# Patient Record
Sex: FEMALE | Race: WHITE | NOT HISPANIC OR LATINO | Employment: FULL TIME | URBAN - METROPOLITAN AREA
[De-identification: names, ages, dates, MRNs, and addresses within clinical notes are randomized per-mention and may not be internally consistent; named-entity substitution may affect disease eponyms.]

---

## 2018-07-18 ENCOUNTER — TELEPHONE (OUTPATIENT)
Dept: FAMILY MEDICINE | Facility: CLINIC | Age: 43
End: 2018-07-18

## 2018-08-29 ENCOUNTER — TELEPHONE (OUTPATIENT)
Dept: FAMILY MEDICINE | Facility: CLINIC | Age: 43
End: 2018-08-29

## 2018-08-29 ENCOUNTER — APPOINTMENT (OUTPATIENT)
Dept: URBAN - METROPOLITAN AREA CLINIC 200 | Age: 43
Setting detail: DERMATOLOGY
End: 2018-09-11

## 2018-08-29 DIAGNOSIS — D22 MELANOCYTIC NEVI: ICD-10-CM

## 2018-08-29 DIAGNOSIS — L57.8 OTHER SKIN CHANGES DUE TO CHRONIC EXPOSURE TO NONIONIZING RADIATION: ICD-10-CM

## 2018-08-29 DIAGNOSIS — R20.2 PARESTHESIA OF SKIN: ICD-10-CM

## 2018-08-29 PROBLEM — D22.62 MELANOCYTIC NEVI OF LEFT UPPER LIMB, INCLUDING SHOULDER: Status: ACTIVE | Noted: 2018-08-29

## 2018-08-29 PROBLEM — L85.3 XEROSIS CUTIS: Status: ACTIVE | Noted: 2018-08-29

## 2018-08-29 PROCEDURE — OTHER COUNSELING: OTHER

## 2018-08-29 PROCEDURE — 99213 OFFICE O/P EST LOW 20 MIN: CPT

## 2018-08-29 ASSESSMENT — LOCATION SIMPLE DESCRIPTION DERM
LOCATION SIMPLE: LEFT THUMB
LOCATION SIMPLE: CHEST
LOCATION SIMPLE: LEFT UPPER BACK

## 2018-08-29 ASSESSMENT — LOCATION DETAILED DESCRIPTION DERM
LOCATION DETAILED: LEFT MEDIAL SUPERIOR CHEST
LOCATION DETAILED: LEFT PROXIMAL RADIAL THUMB
LOCATION DETAILED: LEFT SUPERIOR MEDIAL UPPER BACK

## 2018-08-29 ASSESSMENT — LOCATION ZONE DERM
LOCATION ZONE: FINGER
LOCATION ZONE: TRUNK

## 2018-08-29 NOTE — TELEPHONE ENCOUNTER
Pt called requesting a referral. Referral put in for derm, asked pt to make appt. Its been over 1 year since last visit

## 2018-09-26 ENCOUNTER — OFFICE VISIT (OUTPATIENT)
Dept: FAMILY MEDICINE | Facility: CLINIC | Age: 43
End: 2018-09-26
Payer: COMMERCIAL

## 2018-09-26 VITALS
SYSTOLIC BLOOD PRESSURE: 116 MMHG | RESPIRATION RATE: 16 BRPM | HEART RATE: 80 BPM | DIASTOLIC BLOOD PRESSURE: 70 MMHG | TEMPERATURE: 98.3 F | WEIGHT: 137 LBS

## 2018-09-26 DIAGNOSIS — M50.30 DDD (DEGENERATIVE DISC DISEASE), CERVICAL: Primary | ICD-10-CM

## 2018-09-26 PROBLEM — J30.9 ALLERGIC RHINITIS: Status: ACTIVE | Noted: 2018-09-26

## 2018-09-26 PROCEDURE — 99213 OFFICE O/P EST LOW 20 MIN: CPT | Performed by: FAMILY MEDICINE

## 2018-09-26 RX ORDER — CYCLOBENZAPRINE HCL 10 MG
10 TABLET ORAL 2 TIMES DAILY PRN
Qty: 20 TABLET | Refills: 0 | Status: SHIPPED | OUTPATIENT
Start: 2018-09-26 | End: 2019-02-22 | Stop reason: ALTCHOICE

## 2018-09-26 RX ORDER — NAPROXEN 500 MG/1
500 TABLET ORAL 2 TIMES DAILY WITH MEALS
Qty: 20 TABLET | Refills: 2 | Status: SHIPPED | OUTPATIENT
Start: 2018-09-26 | End: 2019-02-22 | Stop reason: ALTCHOICE

## 2018-09-26 RX ORDER — MINERAL OIL
180 ENEMA (ML) RECTAL DAILY
COMMUNITY
Start: 2017-06-13 | End: 2021-04-23 | Stop reason: ALTCHOICE

## 2018-09-26 ASSESSMENT — ENCOUNTER SYMPTOMS
ACTIVITY CHANGE: 1
CHILLS: 0
FEVER: 0
NECK STIFFNESS: 1
ARTHRALGIAS: 1
NECK PAIN: 1
FATIGUE: 0

## 2018-09-26 NOTE — PROGRESS NOTES
Subjective      Patient ID: Holly Peterson is a 42 y.o. female.    Has had for years. Was evaluated in past -hx of DDD. Never did any treatment. Slight pain into right shoulder      Neck Pain    This is a recurrent problem. The current episode started in the past 7 days. The problem occurs constantly. The pain is associated with an unknown factor. The pain is present in the midline (mid and bottom right). The pain is moderate. The symptoms are aggravated by bending. Stiffness is present in the morning. Pertinent negatives include no fever.       Tobacco  Allergies  Meds  Problems  Med Hx  Surg Hx  Fam Hx  Soc Hx        Review of Systems   Constitutional: Positive for activity change. Negative for chills, fatigue and fever.   Musculoskeletal: Positive for arthralgias, neck pain and neck stiffness.     Allergies   Allergen Reactions   • No Known Allergies      Current Outpatient Prescriptions   Medication Sig Dispense Refill   • fexofenadine (ALLEGRA) 180 mg tablet Take 180 mg by mouth daily.     • cyclobenzaprine (FLEXERIL) 10 mg tablet Take 1 tablet (10 mg total) by mouth 2 (two) times a day as needed for muscle spasms. 20 tablet 0   • naproxen (NAPROSYN) 500 mg tablet Take 1 tablet (500 mg total) by mouth 2 (two) times a day with meals. 20 tablet 2     No current facility-administered medications for this visit.      Past Medical History:   Diagnosis Date   • Allergic rhinitis      Past Surgical History:   Procedure Laterality Date   • SINUS SURGERY  2006     Social History   Substance Use Topics   • Smoking status: Never Smoker   • Smokeless tobacco: Never Used   • Alcohol use Not on file     History reviewed. No pertinent family history.    Objective   Vitals:    09/26/18 1058   BP: 116/70   Pulse: 80   Resp: 16   Temp: 36.8 °C (98.3 °F)   Weight: 62.1 kg (137 lb)    There is no height or weight on file to calculate BMI.  Physical Exam   Constitutional: She is oriented to person, place, and time. She appears  well-developed and well-nourished. She appears distressed (uncomfortable).   Musculoskeletal:        Right shoulder: Normal.        Left shoulder: Normal.        Cervical back: She exhibits decreased range of motion, tenderness, pain and spasm.   Neurological: She is alert and oriented to person, place, and time. She has normal strength and normal reflexes. No sensory deficit.       Assessment/Plan   Problem List Items Addressed This Visit        Other    DDD (degenerative disc disease), cervical - Primary    Relevant Medications    cyclobenzaprine (FLEXERIL) 10 mg tablet    naproxen (NAPROSYN) 500 mg tablet    Other Relevant Orders    Ambulatory referral to Physical Therapy      Patient with history of underlying degenerative disc disease and degenerative joint disease of her cervical spine.  This appears to be acute on chronic pain.  Try Flexeril at night.  Rx Naprosyn twice daily.  Patient never followed up and had physical therapy in the past so we will try this-prescription given.  Discussed with patient to follow-up with Orth O and/or get MRI if no improvement after 4 weeks.    Patient agrees and verbalizes understanding of medication and treatment plan discussed at today's visit.  Patient was instructed to call or follow-up if symptoms persist or worsen.    No notes on file    Gwen Peng DO    This note was created with voice recognition software. Inadvertent dictation errors should be disregarded. Please contact my office for clarification.

## 2019-02-22 ENCOUNTER — OFFICE VISIT (OUTPATIENT)
Dept: FAMILY MEDICINE | Facility: CLINIC | Age: 44
End: 2019-02-22
Payer: COMMERCIAL

## 2019-02-22 VITALS
WEIGHT: 133.6 LBS | HEIGHT: 69 IN | SYSTOLIC BLOOD PRESSURE: 110 MMHG | HEART RATE: 92 BPM | BODY MASS INDEX: 19.79 KG/M2 | RESPIRATION RATE: 16 BRPM | TEMPERATURE: 98.2 F | DIASTOLIC BLOOD PRESSURE: 70 MMHG

## 2019-02-22 DIAGNOSIS — R50.9 FEBRILE ILLNESS: Primary | ICD-10-CM

## 2019-02-22 DIAGNOSIS — J10.1 INFLUENZA A: ICD-10-CM

## 2019-02-22 DIAGNOSIS — M50.30 DDD (DEGENERATIVE DISC DISEASE), CERVICAL: ICD-10-CM

## 2019-02-22 PROCEDURE — 99214 OFFICE O/P EST MOD 30 MIN: CPT | Performed by: FAMILY MEDICINE

## 2019-02-22 PROCEDURE — 87804 INFLUENZA ASSAY W/OPTIC: CPT | Performed by: FAMILY MEDICINE

## 2019-02-22 RX ORDER — BUDESONIDE 0.5 MG/2ML
0.5 INHALANT ORAL AS NEEDED
COMMUNITY
Start: 2019-02-19 | End: 2021-04-23 | Stop reason: ALTCHOICE

## 2019-02-22 RX ORDER — OSELTAMIVIR PHOSPHATE 75 MG/1
75 CAPSULE ORAL 2 TIMES DAILY
Qty: 10 CAPSULE | Refills: 0 | Status: SHIPPED | OUTPATIENT
Start: 2019-02-22 | End: 2019-02-27

## 2019-02-22 ASSESSMENT — ENCOUNTER SYMPTOMS
SINUS PAIN: 1
ACTIVITY CHANGE: 1
FEVER: 1
COUGH: 1
FATIGUE: 1
CHILLS: 1
HEADACHES: 1
SINUS PRESSURE: 1

## 2019-02-22 NOTE — PROGRESS NOTES
"Subjective      Patient ID: Holly Peterson is a 43 y.o. female.    HPI    Med Hx  Surg Hx       Review of Systems  Allergies   Allergen Reactions   • No Known Allergies      Current Outpatient Prescriptions   Medication Sig Dispense Refill   • cyclobenzaprine (FLEXERIL) 10 mg tablet Take 1 tablet (10 mg total) by mouth 2 (two) times a day as needed for muscle spasms. (Patient not taking: Reported on 2/22/2019 ) 20 tablet 0   • fexofenadine (ALLEGRA) 180 mg tablet Take 180 mg by mouth daily.     • naproxen (NAPROSYN) 500 mg tablet Take 1 tablet (500 mg total) by mouth 2 (two) times a day with meals. (Patient not taking: Reported on 2/22/2019 ) 20 tablet 2     No current facility-administered medications for this visit.      Past Medical History:   Diagnosis Date   • Allergic rhinitis      Past Surgical History:   Procedure Laterality Date   • SINUS SURGERY  2006     Social History   Substance Use Topics   • Smoking status: Never Smoker   • Smokeless tobacco: Never Used   • Alcohol use Not on file     No family history on file.    Objective   Vitals:    02/22/19 1257   BP: 110/70   Pulse: 92   Resp: 16   Temp: 36.8 °C (98.2 °F)   Weight: 60.6 kg (133 lb 9.6 oz)   Height: 1.753 m (5' 9\")    Body mass index is 19.73 kg/m².  Physical Exam    Assessment/Plan   Problem List Items Addressed This Visit     None          Patient agrees and verbalizes understanding of medication and treatment plan discussed at today's visit.  Patient was instructed to call or follow-up if symptoms persist or worsen.    No notes on file    Gwen Peng, DO    This note was created with voice recognition software. Inadvertent dictation errors should be disregarded. Please contact my office for clarification.  "

## 2019-02-22 NOTE — PROGRESS NOTES
Subjective      Patient ID: Holly Peterson is a 43 y.o. female.    Sons was sick and then patient started with sudden symptoms 3 days ago. Achy, fever and chills, h/a.  Also patient continues to have neck pain daily. PT did not help.  Patient continues to have pain in the neck and down into the arms.  Would like to know what the next step is.      Sinusitis   This is a new problem. The current episode started in the past 7 days. The problem is unchanged. The maximum temperature recorded prior to her arrival was 101 - 101.9 F. The pain is moderate. Associated symptoms include chills, congestion, coughing, headaches, neck pain and sinus pressure.       Allergies  Meds  Problems  Med Hx  Surg Hx       Review of Systems   Constitutional: Positive for activity change, chills, fatigue and fever.   HENT: Positive for congestion, sinus pain and sinus pressure.    Respiratory: Positive for cough.    Musculoskeletal: Positive for neck pain and neck stiffness.   Neurological: Positive for headaches.     Allergies   Allergen Reactions   • No Known Allergies      Current Outpatient Prescriptions   Medication Sig Dispense Refill   • budesonide (PULMICORT) 0.5 mg/2 mL nebulizer solution Take 0.5 mg by nebulization as needed.     • fexofenadine (ALLEGRA) 180 mg tablet Take 180 mg by mouth daily.     • oseltamivir (TAMIFLU) 75 mg capsule Take 1 capsule (75 mg total) by mouth 2 (two) times a day for 5 days. 10 capsule 0     No current facility-administered medications for this visit.      Past Medical History:   Diagnosis Date   • Allergic rhinitis      Past Surgical History:   Procedure Laterality Date   • SINUS SURGERY  2006     Social History   Substance Use Topics   • Smoking status: Never Smoker   • Smokeless tobacco: Never Used   • Alcohol use Not on file     No family history on file.    Objective   Vitals:    02/22/19 1257   BP: 110/70   Pulse: 92   Resp: 16   Temp: 36.8 °C (98.2 °F)   Weight: 60.6 kg (133 lb 9.6 oz)  "  Height: 1.753 m (5' 9\")    Body mass index is 19.73 kg/m².  Physical Exam   Constitutional: She appears well-developed and well-nourished. She has a sickly appearance. She appears distressed (uncomfortable).   HENT:   Head: Normocephalic and atraumatic.   Right Ear: Tympanic membrane, external ear and ear canal normal.   Left Ear: Tympanic membrane, external ear and ear canal normal.   Nose: Mucosal edema and rhinorrhea present. Right sinus exhibits no maxillary sinus tenderness. Left sinus exhibits no maxillary sinus tenderness.   Mouth/Throat: Uvula is midline and mucous membranes are normal. Oropharyngeal exudate: pnd.   Eyes: Conjunctivae are normal.   Cardiovascular: Normal rate, regular rhythm, S1 normal, S2 normal and normal heart sounds.    No murmur heard.  Pulmonary/Chest: Effort normal and breath sounds normal. No respiratory distress. She has no decreased breath sounds. She has no wheezes. She has no rhonchi.   cough   Musculoskeletal:        Cervical back: She exhibits decreased range of motion, tenderness and spasm.   Lymphadenopathy:        Right cervical: No superficial cervical adenopathy present.       Left cervical: No superficial cervical adenopathy present.       Assessment/Plan   Problem List Items Addressed This Visit        Other    DDD (degenerative disc disease), cervical    Relevant Orders    MRI CERVICAL SPINE WITHOUT CONTRAST      Other Visit Diagnoses     Febrile illness    -  Primary    Relevant Orders    POCT Influenza A/B    Influenza A        Relevant Medications    oseltamivir (TAMIFLU) 75 mg capsule      Patient with influenza A.  Rx Tamiflu.  Fluids and rest.  Follow-up as needed.  Also discussed persistent neck pain.  Will order MRI of neck.  Will consider orthopedic evaluation after findings    Patient agrees and verbalizes understanding of medication and treatment plan discussed at today's visit.  Patient was instructed to call or follow-up if symptoms persist or " worsen.    No notes on file    Gwen Peng, DO    This note was created with voice recognition software. Inadvertent dictation errors should be disregarded. Please contact my office for clarification.

## 2019-02-27 LAB
RAPID INFLUENZA A AGN: POSITIVE
RAPID INFLUENZA B AGN: NEGATIVE

## 2019-02-27 ASSESSMENT — ENCOUNTER SYMPTOMS
NECK STIFFNESS: 1
NECK PAIN: 1

## 2019-04-17 ENCOUNTER — TELEPHONE (OUTPATIENT)
Dept: FAMILY MEDICINE | Facility: CLINIC | Age: 44
End: 2019-04-17

## 2019-04-22 ENCOUNTER — TELEPHONE (OUTPATIENT)
Dept: FAMILY MEDICINE | Facility: CLINIC | Age: 44
End: 2019-04-22

## 2019-07-30 ENCOUNTER — TELEPHONE (OUTPATIENT)
Dept: FAMILY MEDICINE | Facility: CLINIC | Age: 44
End: 2019-07-30

## 2020-01-13 ENCOUNTER — APPOINTMENT (OUTPATIENT)
Dept: URBAN - METROPOLITAN AREA CLINIC 200 | Age: 45
Setting detail: DERMATOLOGY
End: 2020-01-27

## 2020-01-13 DIAGNOSIS — L57.8 OTHER SKIN CHANGES DUE TO CHRONIC EXPOSURE TO NONIONIZING RADIATION: ICD-10-CM

## 2020-01-13 DIAGNOSIS — Z12.83 ENCOUNTER FOR SCREENING FOR MALIGNANT NEOPLASM OF SKIN: ICD-10-CM

## 2020-01-13 PROCEDURE — OTHER MIPS QUALITY: OTHER

## 2020-01-13 PROCEDURE — 99213 OFFICE O/P EST LOW 20 MIN: CPT

## 2020-01-13 PROCEDURE — OTHER REASSURANCE: OTHER

## 2020-01-13 PROCEDURE — OTHER COUNSELING: OTHER

## 2020-01-13 ASSESSMENT — LOCATION DETAILED DESCRIPTION DERM
LOCATION DETAILED: RIGHT SUPERIOR MEDIAL UPPER BACK
LOCATION DETAILED: LEFT MEDIAL SUPERIOR CHEST

## 2020-01-13 ASSESSMENT — LOCATION SIMPLE DESCRIPTION DERM
LOCATION SIMPLE: RIGHT UPPER BACK
LOCATION SIMPLE: CHEST

## 2020-01-13 ASSESSMENT — LOCATION ZONE DERM: LOCATION ZONE: TRUNK

## 2020-01-17 ENCOUNTER — TELEPHONE (OUTPATIENT)
Dept: FAMILY MEDICINE | Facility: CLINIC | Age: 45
End: 2020-01-17

## 2020-01-22 ENCOUNTER — HOSPITAL ENCOUNTER (OUTPATIENT)
Dept: RADIOLOGY | Age: 45
Discharge: HOME | End: 2020-01-22
Attending: FAMILY MEDICINE
Payer: COMMERCIAL

## 2020-01-22 DIAGNOSIS — M50.30 DDD (DEGENERATIVE DISC DISEASE), CERVICAL: ICD-10-CM

## 2020-01-22 NOTE — RESULT ENCOUNTER NOTE
MRI received that was ordered back in February 2019.( I have not seen patient since)  These let patient know that her MRI shows some arthritic changes and some disc degeneration in her cervical spine worse at C6-C7.  I would recommend she see an orthopedist for this.  Please give patient the number for Dr. aurea Bullock at [phone] or Dr. Gil Fletcher at [phone].  If she would like to discuss this further than she needs to come in for an office visit for reevaluation.

## 2020-01-23 ENCOUNTER — TELEPHONE (OUTPATIENT)
Dept: FAMILY MEDICINE | Facility: CLINIC | Age: 45
End: 2020-01-23

## 2020-01-23 NOTE — TELEPHONE ENCOUNTER
----- Message from Gwen Peng DO sent at 1/22/2020  4:40 PM EST -----  MRI received that was ordered back in February 2019.( I have not seen patient since)  These let patient know that her MRI shows some arthritic changes and some disc degeneration in her cervical spine worse at C6-C7.  I would recommend she see an orthopedist for this.  Please give patient the number for Dr. aurea Bullock at [phone] or Dr. Gil Fletcher at [phone].  If she would like to discuss this further than she needs to come in for an office visit for reevaluation.

## 2020-10-17 ENCOUNTER — TELEMEDICINE (OUTPATIENT)
Dept: FAMILY MEDICINE | Facility: CLINIC | Age: 45
End: 2020-10-17
Payer: COMMERCIAL

## 2020-10-17 DIAGNOSIS — J01.91 ACUTE RECURRENT SINUSITIS, UNSPECIFIED LOCATION: Primary | ICD-10-CM

## 2020-10-17 PROCEDURE — 99214 OFFICE O/P EST MOD 30 MIN: CPT | Mod: 95 | Performed by: FAMILY MEDICINE

## 2020-10-17 RX ORDER — CEFUROXIME AXETIL 500 MG/1
500 TABLET ORAL 2 TIMES DAILY
Qty: 20 TABLET | Refills: 0 | Status: SHIPPED | OUTPATIENT
Start: 2020-10-17 | End: 2020-10-27

## 2020-10-17 NOTE — PROGRESS NOTES
Verification of Patient Location:  The patient affirms they are currently located in the following state: [place]    Request for Consent:    Video Encounter   [place], my name is Ted RutherfordDO.  Before we proceed, can you please verify your identification by telling me your full name and date of birth?  Can you tell me who is in the room with you?    You and I are about to have a telemedicine check-in or visit because you have requested it.  This is a live video-conference.  I am a real person, speaking to you in real time.  There is no one else with me on the video-conference.  However, when we use ([employer], etc) it is important for you to know that the video-conference may not be secure or private.  I am not recording this conversation and I am asking you not to record it.  This telemedicine visit will be billed to your health insurance or you, if you are self-insured.  You understand you will be responsible for any copayments or coinsurances that apply to your telemedicine visit.  Communication platform used for this encounter:  [employer]     Before starting our telemedicine visit, I am required to get your consent for this virtual check-in or visit by telemedicine. Do you consent?      Patient Response to Request for Consent:  Yes      Visit Documentation:  Subjective     Patient ID: Holly Peterson is a 45 y.o. female.  1975      Patient reports a history of sinus issues.  She constantly has postnasal drip and congestion and reports that in the past 24 hours the drainage has become yellow and she is having facial pressure as well as upper teeth pain.  She has been had a couple of sinus surgery she reports and was seeing an allergist, will be seeing a new allergist in a couple of weeks.  She has no fever or significant cough.  No chest pain or shortness of breath.      The following have been reviewed and updated as appropriate in this visit:  Tobacco  Allergies  Meds  Problems  Med Hx   Surg Hx  Fam Hx       Review of Systems   All other systems reviewed and are negative.        Assessment/Plan   Diagnoses and all orders for this visit:    Acute recurrent sinusitis, unspecified location (Primary)    Other orders  -     cefuroxime (CEFTIN) 500 mg tablet; Take 1 tablet (500 mg total) by mouth 2 (two) times a day for 10 days.    Follow-up with allergist as planned, treat with Ceftin twice daily for 10 days.    Time Spent in Medical Discussion During This Encounter:     6 minutes

## 2021-04-23 ENCOUNTER — OFFICE VISIT (OUTPATIENT)
Dept: FAMILY MEDICINE | Facility: CLINIC | Age: 46
End: 2021-04-23
Payer: COMMERCIAL

## 2021-04-23 VITALS
BODY MASS INDEX: 19.79 KG/M2 | HEIGHT: 69 IN | RESPIRATION RATE: 16 BRPM | OXYGEN SATURATION: 100 % | HEART RATE: 70 BPM | DIASTOLIC BLOOD PRESSURE: 64 MMHG | TEMPERATURE: 96.8 F | WEIGHT: 133.6 LBS | SYSTOLIC BLOOD PRESSURE: 104 MMHG

## 2021-04-23 DIAGNOSIS — R41.840 SHORT ATTENTION SPAN: ICD-10-CM

## 2021-04-23 DIAGNOSIS — R00.2 PALPITATIONS: Primary | ICD-10-CM

## 2021-04-23 PROCEDURE — 3008F BODY MASS INDEX DOCD: CPT | Performed by: FAMILY MEDICINE

## 2021-04-23 PROCEDURE — 99214 OFFICE O/P EST MOD 30 MIN: CPT | Performed by: FAMILY MEDICINE

## 2021-04-23 PROCEDURE — 36415 COLL VENOUS BLD VENIPUNCTURE: CPT | Performed by: FAMILY MEDICINE

## 2021-04-23 PROCEDURE — 93000 ELECTROCARDIOGRAM COMPLETE: CPT | Performed by: FAMILY MEDICINE

## 2021-04-23 RX ORDER — CETIRIZINE HYDROCHLORIDE 10 MG/1
10 TABLET ORAL DAILY
COMMUNITY

## 2021-04-23 ASSESSMENT — ENCOUNTER SYMPTOMS
FATIGUE: 0
DIZZINESS: 0
PALPITATIONS: 0
CHILLS: 0
VOMITING: 0
NAUSEA: 0
ABDOMINAL PAIN: 0
SHORTNESS OF BREATH: 0
SLEEP DISTURBANCE: 0
DIAPHORESIS: 0
LIGHT-HEADEDNESS: 0
HEADACHES: 0
FEVER: 0

## 2021-04-23 NOTE — PROGRESS NOTES
"      Subjective      Patient ID: Holly Peterson is a 45 y.o. female.  1975      Patient feels like her attention is an issue.  All 3 of her boys have ADD and dyslexia. She has a difficult time focusing on tasks and can not complete her tasks.  She gets distracted easily.      Palpitations   This is a new problem. The current episode started more than 1 month ago. The problem occurs intermittently. The problem has been waxing and waning. Nothing aggravates the symptoms. Pertinent negatives include no chest pain, diaphoresis, dizziness, fever, nausea, shortness of breath or vomiting. She has tried breathing exercises for the symptoms.       The following have been reviewed and updated as appropriate in this visit:  Meds  Problems  Med Hx  Surg Hx  Fam Hx       Review of Systems   Constitutional: Negative for chills, diaphoresis, fatigue and fever.   Respiratory: Negative for shortness of breath.    Cardiovascular: Negative for chest pain, palpitations and leg swelling.   Gastrointestinal: Negative for abdominal pain, nausea and vomiting.   Neurological: Negative for dizziness, light-headedness and headaches.   Psychiatric/Behavioral: Negative for sleep disturbance.       Objective     Vitals:    04/23/21 0959   BP: 104/64   Pulse: 70   Resp: 16   Temp: (!) 36 °C (96.8 °F)   SpO2: 100%   Weight: 60.6 kg (133 lb 9.6 oz)   Height: 1.753 m (5' 9\")     Body mass index is 19.73 kg/m².    Physical Exam  Vitals reviewed.   Constitutional:       Appearance: Normal appearance.   Neck:      Thyroid: No thyromegaly.   Cardiovascular:      Rate and Rhythm: Normal rate and regular rhythm.      Heart sounds: Normal heart sounds.   Pulmonary:      Effort: Pulmonary effort is normal. No respiratory distress.      Breath sounds: Normal breath sounds. No wheezing or rales.   Abdominal:      General: Bowel sounds are normal. There is no distension.      Palpations: Abdomen is soft.      Tenderness: There is no abdominal " tenderness.   Musculoskeletal:      Cervical back: Neck supple.   Lymphadenopathy:      Cervical: No cervical adenopathy.   Neurological:      Mental Status: She is alert.   Psychiatric:         Mood and Affect: Mood normal.         Behavior: Behavior normal.         Assessment/Plan   Diagnoses and all orders for this visit:    Palpitations (Primary)  Assessment & Plan:  EKG normal  Check blood work  If symptoms persist, will order holter monitor    Orders:  -     ECG 12 LEAD OFFICE PERFORMED  -     CBC and differential  -     Comprehensive metabolic panel  -     TSH    Short attention span  Assessment & Plan:  Refer to [facility] for testing  May be underlying anxiety

## 2021-04-24 LAB
ALBUMIN SERPL-MCNC: 4.7 G/DL (ref 3.8–4.8)
ALBUMIN/GLOB SERPL: 1.9 {RATIO} (ref 1.2–2.2)
ALP SERPL-CCNC: 65 IU/L (ref 39–117)
ALT SERPL-CCNC: 11 IU/L (ref 0–32)
AST SERPL-CCNC: 13 IU/L (ref 0–40)
BASOPHILS # BLD AUTO: 0.1 X10E3/UL (ref 0–0.2)
BASOPHILS NFR BLD AUTO: 2 %
BILIRUB SERPL-MCNC: 0.3 MG/DL (ref 0–1.2)
BUN SERPL-MCNC: 11 MG/DL (ref 6–24)
BUN/CREAT SERPL: 14 (ref 9–23)
CALCIUM SERPL-MCNC: 9.7 MG/DL (ref 8.7–10.2)
CHLORIDE SERPL-SCNC: 102 MMOL/L (ref 96–106)
CO2 SERPL-SCNC: 28 MMOL/L (ref 20–29)
CREAT SERPL-MCNC: 0.8 MG/DL (ref 0.57–1)
EOSINOPHIL # BLD AUTO: 0.1 X10E3/UL (ref 0–0.4)
EOSINOPHIL NFR BLD AUTO: 4 %
ERYTHROCYTE [DISTWIDTH] IN BLOOD BY AUTOMATED COUNT: 12.1 % (ref 11.7–15.4)
GLOBULIN SER CALC-MCNC: 2.5 G/DL (ref 1.5–4.5)
GLUCOSE SERPL-MCNC: 111 MG/DL (ref 65–99)
HCT VFR BLD AUTO: 36.8 % (ref 34–46.6)
HGB BLD-MCNC: 12.5 G/DL (ref 11.1–15.9)
IMM GRANULOCYTES # BLD AUTO: 0 X10E3/UL (ref 0–0.1)
IMM GRANULOCYTES NFR BLD AUTO: 1 %
LAB CORP EGFR IF AFRICN AM: 104 ML/MIN/1.73
LAB CORP EGFR IF NONAFRICN AM: 91 ML/MIN/1.73
LYMPHOCYTES # BLD AUTO: 1.3 X10E3/UL (ref 0.7–3.1)
LYMPHOCYTES NFR BLD AUTO: 36 %
MCH RBC QN AUTO: 30.5 PG (ref 26.6–33)
MCHC RBC AUTO-ENTMCNC: 34 G/DL (ref 31.5–35.7)
MCV RBC AUTO: 90 FL (ref 79–97)
MONOCYTES # BLD AUTO: 0.4 X10E3/UL (ref 0.1–0.9)
MONOCYTES NFR BLD AUTO: 10 %
NEUTROPHILS # BLD AUTO: 1.7 X10E3/UL (ref 1.4–7)
NEUTROPHILS NFR BLD AUTO: 47 %
PLATELET # BLD AUTO: 220 X10E3/UL (ref 150–450)
POTASSIUM SERPL-SCNC: 4.5 MMOL/L (ref 3.5–5.2)
PROT SERPL-MCNC: 7.2 G/DL (ref 6–8.5)
RBC # BLD AUTO: 4.1 X10E6/UL (ref 3.77–5.28)
SODIUM SERPL-SCNC: 141 MMOL/L (ref 134–144)
TSH SERPL DL<=0.005 MIU/L-ACNC: 1.13 UIU/ML (ref 0.45–4.5)
WBC # BLD AUTO: 3.5 X10E3/UL (ref 3.4–10.8)

## 2021-06-02 ENCOUNTER — TELEPHONE (OUTPATIENT)
Dept: FAMILY MEDICINE | Facility: CLINIC | Age: 46
End: 2021-06-02

## 2021-06-02 NOTE — TELEPHONE ENCOUNTER
----- Message from Mariam Barragan DO sent at 6/2/2021  8:50 AM EDT -----  Notify patient blood work all good.  BS a little high at 11 but she was not fasting. She should get FBW done to recheck this and get lipids done as well.

## 2021-06-02 NOTE — RESULT ENCOUNTER NOTE
Notify patient blood work all good.  BS a little high at 11 but she was not fasting. She should get FBW done to recheck this and get lipids done as well.

## 2021-07-30 ENCOUNTER — TELEPHONE (OUTPATIENT)
Dept: FAMILY MEDICINE | Facility: CLINIC | Age: 46
End: 2021-07-30

## 2021-07-30 NOTE — TELEPHONE ENCOUNTER
Spoke with patient. Patient has had a rash since Sunday. 2.5% hydrocortisone and benadryl are not helping. The rash is on her abdomen, back, buttock, thighs. She is asking about a prescription for prednisone to see if that would help.

## 2021-07-30 NOTE — TELEPHONE ENCOUNTER
Spoke with patient. Advised patient to go to urgent care as we do not have any available appointments open.

## 2021-07-30 NOTE — TELEPHONE ENCOUNTER
She needs an appointment to be seen.  If there is nothing available she will need to go to urgent care.

## 2021-08-02 ENCOUNTER — OFFICE VISIT (OUTPATIENT)
Dept: FAMILY MEDICINE | Facility: CLINIC | Age: 46
End: 2021-08-02
Payer: COMMERCIAL

## 2021-08-02 VITALS
HEIGHT: 69 IN | HEART RATE: 72 BPM | BODY MASS INDEX: 19.64 KG/M2 | RESPIRATION RATE: 16 BRPM | SYSTOLIC BLOOD PRESSURE: 140 MMHG | WEIGHT: 132.6 LBS | TEMPERATURE: 98.2 F | DIASTOLIC BLOOD PRESSURE: 90 MMHG

## 2021-08-02 DIAGNOSIS — J01.91 ACUTE RECURRENT SINUSITIS, UNSPECIFIED LOCATION: ICD-10-CM

## 2021-08-02 DIAGNOSIS — R21 RASH: Primary | ICD-10-CM

## 2021-08-02 PROCEDURE — 3008F BODY MASS INDEX DOCD: CPT | Performed by: FAMILY MEDICINE

## 2021-08-02 PROCEDURE — 99213 OFFICE O/P EST LOW 20 MIN: CPT | Performed by: FAMILY MEDICINE

## 2021-08-02 RX ORDER — TRIAMCINOLONE ACETONIDE 1 MG/G
CREAM TOPICAL 2 TIMES DAILY
Qty: 80 G | Refills: 0 | Status: SHIPPED | OUTPATIENT
Start: 2021-08-02 | End: 2024-06-18

## 2021-08-02 RX ORDER — AZELASTINE 1 MG/ML
2 SPRAY, METERED NASAL 2 TIMES DAILY PRN
Qty: 30 ML | Refills: 2 | Status: SHIPPED | OUTPATIENT
Start: 2021-08-02 | End: 2024-06-18

## 2021-08-02 RX ORDER — PREDNISONE 20 MG/1
TABLET ORAL
Qty: 12 TABLET | Refills: 0 | Status: SHIPPED | OUTPATIENT
Start: 2021-08-02 | End: 2021-09-15

## 2021-08-02 ASSESSMENT — ENCOUNTER SYMPTOMS
CONSTITUTIONAL NEGATIVE: 1
GASTROINTESTINAL NEGATIVE: 1
RESPIRATORY NEGATIVE: 1
EYES NEGATIVE: 1
CARDIOVASCULAR NEGATIVE: 1

## 2021-08-02 NOTE — PROGRESS NOTES
"Subjective      Patient ID: Holly Peterson is a 45 y.o. female.    Pt is here for itchy rash.  She was camping this weekend.  She has a very itchy rash,.  She was camping out in a tent      Tobacco  Allergies  Meds  Problems  Med Hx  Surg Hx  Fam Hx       Review of Systems   Constitutional: Negative.    HENT: Negative.    Eyes: Negative.    Respiratory: Negative.    Cardiovascular: Negative.    Gastrointestinal: Negative.    Skin: Positive for rash.     Allergies   Allergen Reactions   • No Known Allergies      Current Outpatient Medications   Medication Sig Dispense Refill   • cetirizine (ZyrTEC) 10 mg tablet Take 10 mg by mouth daily.     • azelastine (ASTELIN) 137 mcg (0.1 %) nasal spray Administer 2 sprays into each nostril 2 (two) times a day as needed for rhinitis. Use in each nostril as directed. 30 mL 2   • predniSONE (DELTASONE) 20 mg tablet Take 2 tablets for 3 days, 1.5 tablets for 2 days, 1 tablet for 2 days and 1/2 tablet for 2 days 12 tablet 0   • triamcinolone (KENALOG) 0.1 % cream Apply topically 2 (two) times a day. 80 g 0     No current facility-administered medications for this visit.     Past Medical History:   Diagnosis Date   • Allergic rhinitis      Past Surgical History:   Procedure Laterality Date   • SINUS SURGERY  2006     Social History     Tobacco Use   • Smoking status: Never Smoker   • Smokeless tobacco: Never Used   Substance Use Topics   • Alcohol use: Not on file   • Drug use: Not on file     Family History   Problem Relation Age of Onset   • Brain Aneurysm Biological Father    • Drug abuse Biological Father        Objective   Vitals:    08/02/21 1423   BP: 140/90   Pulse: 72   Resp: 16   Temp: 36.8 °C (98.2 °F)   Weight: 60.1 kg (132 lb 9.6 oz)   Height: 1.753 m (5' 9\")    Body mass index is 19.58 kg/m².  Physical Exam  Vitals reviewed.   Constitutional:       Appearance: Normal appearance.   Cardiovascular:      Rate and Rhythm: Normal rate and regular rhythm.      Pulses: " Normal pulses.   Pulmonary:      Effort: Pulmonary effort is normal.      Breath sounds: Normal breath sounds.   Abdominal:      General: Abdomen is flat.   Skin:     Findings: Rash present. Rash is papular (with excoriations ).   Neurological:      Mental Status: She is alert.         Assessment/Plan   Problem List Items Addressed This Visit        Respiratory    Acute recurrent sinusitis     Hx of this  Recommended going back to see ENT for scope  Also can try adding astelin  No antibiotic for now         Relevant Medications    azelastine (ASTELIN) 137 mcg (0.1 %) nasal spray       Other    Rash - Primary     Prednisone 40 x 3, 30 x 2, 20 x 2, 10 x 2  Triamcinolone topically '  moisturizer         Relevant Medications    triamcinolone (KENALOG) 0.1 % cream          Patient agrees and verbalizes understanding of medication and treatment plan discussed at today's visit.  Patient was instructed to call or follow-up if symptoms persist or worsen.    No notes on file    Yola Enriquez DO      This note was created with voice recognition software. Inadvertent dictation errors should be disregarded. Please contact my office for clarification.

## 2021-08-03 NOTE — ASSESSMENT & PLAN NOTE
Hx of this  Recommended going back to see ENT for scope  Also can try adding astelin  No antibiotic for now

## 2021-09-15 ENCOUNTER — CLINICAL SUPPORT (OUTPATIENT)
Dept: FAMILY MEDICINE | Facility: CLINIC | Age: 46
End: 2021-09-15
Payer: COMMERCIAL

## 2021-09-15 ENCOUNTER — TELEMEDICINE (OUTPATIENT)
Dept: FAMILY MEDICINE | Facility: CLINIC | Age: 46
End: 2021-09-15
Payer: COMMERCIAL

## 2021-09-15 ENCOUNTER — TELEPHONE (OUTPATIENT)
Dept: FAMILY MEDICINE | Facility: CLINIC | Age: 46
End: 2021-09-15

## 2021-09-15 DIAGNOSIS — R43.2 LOSS OF TASTE: ICD-10-CM

## 2021-09-15 DIAGNOSIS — R50.9 FEBRILE ILLNESS: ICD-10-CM

## 2021-09-15 DIAGNOSIS — R50.9 FEBRILE ILLNESS: Primary | ICD-10-CM

## 2021-09-15 DIAGNOSIS — R43.0 LOSS OF SMELL: ICD-10-CM

## 2021-09-15 PROCEDURE — 99999 PR OFFICE/OUTPT VISIT,PROCEDURE ONLY: CPT | Performed by: FAMILY MEDICINE

## 2021-09-15 PROCEDURE — 99213 OFFICE O/P EST LOW 20 MIN: CPT | Mod: 95 | Performed by: FAMILY MEDICINE

## 2021-09-15 ASSESSMENT — ENCOUNTER SYMPTOMS
FEVER: 1
HEADACHES: 1
FATIGUE: 1
SHORTNESS OF BREATH: 0
ACTIVITY CHANGE: 1
WHEEZING: 0
SORE THROAT: 0
CHILLS: 0
COUGH: 1

## 2021-09-15 NOTE — PROGRESS NOTES
Verification of Patient Location:  The patient affirms they are currently located in the following state: [place]    Request for Consent:    Audio and Video Encounter   [place], my name is Gwen Peng DO.  Before we proceed, can you please verify your identification by telling me your full name and date of birth?  Can you tell me who is in the room with you?    You and I are about to have a telemedicine check-in or visit because you have requested it.  This is a live video-conference.  I am a real person, speaking to you in real time.  There is no one else with me on the video-conference.  However, when we use ("[employer], etc) it is important for you to know that the video-conference may not be secure or private.  I am not recording this conversation and I am asking you not to record it.  This telemedicine visit will be billed to your health insurance or you, if you are self-insured.  You understand you will be responsible for any copayments or coinsurances that apply to your telemedicine visit.  Communication platform used for this encounter:  [employer]     Before starting our telemedicine visit, I am required to get your consent for this virtual check-in or visit by telemedicine. Do you consent?      Patient Response to Request for Consent:  Yes      Visit Documentation:  Subjective     Patient ID: Holly Peterson is a 45 y.o. female.  1975      Video telemedicine appointment due to coronavirus pandemic. Patient started with symptoms yesterday.  She started to feel very hot last night her fever was up to 99.3 with feeling of feverishness through the night.  She has a headache and feels very tired.  Patient also noticed today that she had lost her sense of taste and smell.  Patient has history of chronic sinus issues and usually does get a forehead headache but it does not seem to be exactly the same.  She has her normal postnasal drip but now has an occasional cough.  Patient feeling very  tired.  Through video encounter patient is laying in bed because she is tired and looks like she does not feel well.  Patient has not had a Covid vaccine.      The following have been reviewed and updated as appropriate in this visit:  Tobacco  Problems  Med Hx  Surg Hx  Fam Hx       Review of Systems   Constitutional: Positive for activity change, fatigue and fever. Negative for chills.   HENT: Positive for congestion and postnasal drip. Negative for sore throat.         Loss of sense of taste and smell   Respiratory: Positive for cough. Negative for shortness of breath and wheezing.    Neurological: Positive for headaches.         Assessment/Plan   Diagnoses and all orders for this visit:    Febrile illness (Primary)  -     COVID-19 [facility] ([facility]); Future    Loss of taste    Loss of smell    Patient with fatigue and febrile illness with loss attempts of taste and smell.  Suspect COVID-19.  Testing ordered and patient is to isolate at home until results are back.  Fluids and rest.    Time Spent:  I spent 9 minutes on this date of service performing the following activities: obtaining history, performing examination, entering orders, documenting and preparing for visit.

## 2021-09-18 ENCOUNTER — TELEPHONE (OUTPATIENT)
Dept: FAMILY MEDICINE | Facility: CLINIC | Age: 46
End: 2021-09-18

## 2021-09-18 LAB — SARS-COV-2 RNA RESP QL NAA+PROBE: DETECTED

## 2021-09-18 NOTE — TELEPHONE ENCOUNTER
Discussed with patient positive Covid. Needs to isolate at home until the 24th. Answered questions.

## 2021-09-21 ENCOUNTER — TELEPHONE (OUTPATIENT)
Dept: FAMILY MEDICINE | Facility: CLINIC | Age: 46
End: 2021-09-21

## 2021-09-21 NOTE — TELEPHONE ENCOUNTER
Patient notified that the medication prescribed for her rash, over the summer, was Triamcinolone cream.  Patient states she has the same rash and wanted to make sure she was using the correct cream (which she is). It does not appear to be working.   She is also covid (+) and I stated sometimes patient get a rash with covid, also.   She will keep an eye on it for now and call back if sx's persist.

## 2021-09-23 ENCOUNTER — APPOINTMENT (OUTPATIENT)
Dept: URBAN - METROPOLITAN AREA CLINIC 200 | Age: 46
Setting detail: DERMATOLOGY
End: 2021-09-27

## 2021-09-23 DIAGNOSIS — Z11.52 ENCOUNTER FOR SCREENING FOR COVID-19: ICD-10-CM

## 2021-09-23 DIAGNOSIS — L50.8 OTHER URTICARIA: ICD-10-CM

## 2021-09-23 PROBLEM — L30.9 DERMATITIS, UNSPECIFIED: Status: ACTIVE | Noted: 2021-09-23

## 2021-09-23 PROCEDURE — OTHER COUNSELING: OTHER

## 2021-09-23 PROCEDURE — OTHER SCREENING FOR COVID-19: OTHER

## 2021-09-23 PROCEDURE — 99213 OFFICE O/P EST LOW 20 MIN: CPT

## 2021-09-23 PROCEDURE — OTHER PRESCRIPTION: OTHER

## 2021-09-23 RX ORDER — BETAMETHASONE DIPROPIONATE 0.5 MG/G
CREAM, AUGMENTED TOPICAL
Qty: 50 | Refills: 0 | Status: ERX | COMMUNITY
Start: 2021-09-23

## 2021-09-23 RX ORDER — METHYLPREDNISOLONE 4 MG/1
TABLET ORAL
Qty: 1 | Refills: 1 | Status: ERX | COMMUNITY
Start: 2021-09-23

## 2021-09-23 ASSESSMENT — LOCATION ZONE DERM
LOCATION ZONE: TRUNK
LOCATION ZONE: LEG

## 2021-09-23 ASSESSMENT — LOCATION DETAILED DESCRIPTION DERM
LOCATION DETAILED: LEFT ANTERIOR PROXIMAL THIGH
LOCATION DETAILED: PERIUMBILICAL SKIN
LOCATION DETAILED: SUPERIOR THORACIC SPINE
LOCATION DETAILED: RIGHT ANTERIOR PROXIMAL THIGH
LOCATION DETAILED: LEFT MEDIAL SUPERIOR CHEST

## 2021-09-23 ASSESSMENT — LOCATION SIMPLE DESCRIPTION DERM
LOCATION SIMPLE: RIGHT THIGH
LOCATION SIMPLE: LEFT THIGH
LOCATION SIMPLE: CHEST
LOCATION SIMPLE: UPPER BACK
LOCATION SIMPLE: ABDOMEN

## 2021-10-19 ENCOUNTER — APPOINTMENT (OUTPATIENT)
Dept: URBAN - METROPOLITAN AREA CLINIC 200 | Age: 46
Setting detail: DERMATOLOGY
End: 2021-10-21

## 2021-10-19 ENCOUNTER — RX ONLY (RX ONLY)
Age: 46
End: 2021-10-19

## 2021-10-19 DIAGNOSIS — Q819 OTHER SPECIFIED ANOMALIES OF SKIN: ICD-10-CM

## 2021-10-19 DIAGNOSIS — L57.8 OTHER SKIN CHANGES DUE TO CHRONIC EXPOSURE TO NONIONIZING RADIATION: ICD-10-CM

## 2021-10-19 DIAGNOSIS — Q826 OTHER SPECIFIED ANOMALIES OF SKIN: ICD-10-CM

## 2021-10-19 DIAGNOSIS — L28.0 LICHEN SIMPLEX CHRONICUS: ICD-10-CM

## 2021-10-19 DIAGNOSIS — Q828 OTHER SPECIFIED ANOMALIES OF SKIN: ICD-10-CM

## 2021-10-19 DIAGNOSIS — L82.1 OTHER SEBORRHEIC KERATOSIS: ICD-10-CM

## 2021-10-19 DIAGNOSIS — Z11.52 ENCOUNTER FOR SCREENING FOR COVID-19: ICD-10-CM

## 2021-10-19 PROBLEM — D23.5 OTHER BENIGN NEOPLASM OF SKIN OF TRUNK: Status: ACTIVE | Noted: 2021-10-19

## 2021-10-19 PROBLEM — J30.1 ALLERGIC RHINITIS DUE TO POLLEN: Status: ACTIVE | Noted: 2021-10-19

## 2021-10-19 PROBLEM — L85.8 OTHER SPECIFIED EPIDERMAL THICKENING: Status: ACTIVE | Noted: 2021-10-19

## 2021-10-19 PROCEDURE — OTHER REASSURANCE: OTHER

## 2021-10-19 PROCEDURE — OTHER MIPS QUALITY: OTHER

## 2021-10-19 PROCEDURE — 99213 OFFICE O/P EST LOW 20 MIN: CPT

## 2021-10-19 PROCEDURE — OTHER SCREENING FOR COVID-19: OTHER

## 2021-10-19 PROCEDURE — OTHER PRESCRIPTION MEDICATION MANAGEMENT: OTHER

## 2021-10-19 PROCEDURE — OTHER COUNSELING: OTHER

## 2021-10-19 PROCEDURE — OTHER SUNSCREEN RECOMMENDATIONS: OTHER

## 2021-10-19 RX ORDER — TRIAMCINOLONE ACETONIDE 1 MG/G
CREAM TOPICAL
Qty: 80 | Refills: 3 | Status: ERX | COMMUNITY
Start: 2021-10-19

## 2021-10-19 ASSESSMENT — LOCATION SIMPLE DESCRIPTION DERM
LOCATION SIMPLE: RIGHT UPPER ARM
LOCATION SIMPLE: LEFT THIGH
LOCATION SIMPLE: RIGHT POSTERIOR UPPER ARM
LOCATION SIMPLE: RIGHT GREAT TOE
LOCATION SIMPLE: ABDOMEN
LOCATION SIMPLE: LEFT POSTERIOR UPPER ARM
LOCATION SIMPLE: RIGHT THIGH
LOCATION SIMPLE: LEFT GREAT TOE

## 2021-10-19 ASSESSMENT — LOCATION DETAILED DESCRIPTION DERM
LOCATION DETAILED: RIGHT DORSAL GREAT TOE
LOCATION DETAILED: LEFT PROXIMAL POSTERIOR UPPER ARM
LOCATION DETAILED: RIGHT PROXIMAL POSTERIOR UPPER ARM
LOCATION DETAILED: LEFT ANTERIOR PROXIMAL THIGH
LOCATION DETAILED: LEFT DORSAL GREAT TOE
LOCATION DETAILED: PERIUMBILICAL SKIN
LOCATION DETAILED: RIGHT PROXIMAL LATERAL POSTERIOR UPPER ARM
LOCATION DETAILED: RIGHT ANTERIOR PROXIMAL THIGH
LOCATION DETAILED: RIGHT ANTERIOR PROXIMAL UPPER ARM

## 2021-10-19 ASSESSMENT — LOCATION ZONE DERM
LOCATION ZONE: LEG
LOCATION ZONE: TRUNK
LOCATION ZONE: ARM
LOCATION ZONE: TOE

## 2021-10-19 ASSESSMENT — PAIN INTENSITY VAS: HOW INTENSE IS YOUR PAIN 0 BEING NO PAIN, 10 BEING THE MOST SEVERE PAIN POSSIBLE?: NO PAIN

## 2022-06-28 ENCOUNTER — TRANSCRIBE ORDERS (OUTPATIENT)
Dept: SCHEDULING | Age: 47
End: 2022-06-28

## 2022-06-28 DIAGNOSIS — R10.2 PELVIC AND PERINEAL PAIN: Primary | ICD-10-CM

## 2024-06-10 ENCOUNTER — TELEPHONE (OUTPATIENT)
Dept: FAMILY MEDICINE | Facility: CLINIC | Age: 49
End: 2024-06-10
Payer: COMMERCIAL

## 2024-06-10 NOTE — TELEPHONE ENCOUNTER
Patient left a message for referral, will do referral as a courtesy but patient has not been in the office since 8/2021 and needs to schedule physical.

## 2024-06-17 ENCOUNTER — APPOINTMENT (OUTPATIENT)
Dept: URBAN - METROPOLITAN AREA CLINIC 203 | Age: 49
Setting detail: DERMATOLOGY
End: 2024-06-20

## 2024-06-17 DIAGNOSIS — Z71.89 OTHER SPECIFIED COUNSELING: ICD-10-CM

## 2024-06-17 DIAGNOSIS — L57.8 OTHER SKIN CHANGES DUE TO CHRONIC EXPOSURE TO NONIONIZING RADIATION: ICD-10-CM

## 2024-06-17 DIAGNOSIS — D22 MELANOCYTIC NEVI: ICD-10-CM

## 2024-06-17 DIAGNOSIS — L81.4 OTHER MELANIN HYPERPIGMENTATION: ICD-10-CM

## 2024-06-17 PROBLEM — D22.71 MELANOCYTIC NEVI OF RIGHT LOWER LIMB, INCLUDING HIP: Status: ACTIVE | Noted: 2024-06-17

## 2024-06-17 PROCEDURE — OTHER COUNSELING: OTHER

## 2024-06-17 PROCEDURE — OTHER PRESCRIPTION MEDICATION MANAGEMENT: OTHER

## 2024-06-17 PROCEDURE — OTHER SUNSCREEN RECOMMENDATIONS: OTHER

## 2024-06-17 PROCEDURE — 99203 OFFICE O/P NEW LOW 30 MIN: CPT

## 2024-06-17 ASSESSMENT — LOCATION SIMPLE DESCRIPTION DERM
LOCATION SIMPLE: CHEST
LOCATION SIMPLE: ABDOMEN
LOCATION SIMPLE: RIGHT KNEE
LOCATION SIMPLE: INFERIOR FOREHEAD
LOCATION SIMPLE: RIGHT UPPER ARM

## 2024-06-17 ASSESSMENT — LOCATION DETAILED DESCRIPTION DERM
LOCATION DETAILED: RIGHT ANTERIOR PROXIMAL UPPER ARM
LOCATION DETAILED: RIGHT KNEE
LOCATION DETAILED: PERIUMBILICAL SKIN
LOCATION DETAILED: MIDDLE STERNUM
LOCATION DETAILED: INFERIOR MID FOREHEAD

## 2024-06-17 ASSESSMENT — LOCATION ZONE DERM
LOCATION ZONE: ARM
LOCATION ZONE: LEG
LOCATION ZONE: FACE
LOCATION ZONE: TRUNK

## 2024-06-18 ENCOUNTER — OFFICE VISIT (OUTPATIENT)
Dept: FAMILY MEDICINE | Facility: CLINIC | Age: 49
End: 2024-06-18
Payer: COMMERCIAL

## 2024-06-18 VITALS
BODY MASS INDEX: 19.91 KG/M2 | DIASTOLIC BLOOD PRESSURE: 82 MMHG | TEMPERATURE: 98.9 F | WEIGHT: 134.4 LBS | HEART RATE: 99 BPM | OXYGEN SATURATION: 99 % | RESPIRATION RATE: 19 BRPM | SYSTOLIC BLOOD PRESSURE: 122 MMHG | HEIGHT: 69 IN

## 2024-06-18 DIAGNOSIS — Z00.01 ENCOUNTER FOR ANNUAL GENERAL MEDICAL EXAMINATION WITH ABNORMAL FINDINGS IN ADULT: Primary | ICD-10-CM

## 2024-06-18 DIAGNOSIS — E55.9 VITAMIN D DEFICIENCY: ICD-10-CM

## 2024-06-18 DIAGNOSIS — Z12.31 SCREENING MAMMOGRAM FOR BREAST CANCER: ICD-10-CM

## 2024-06-18 DIAGNOSIS — H61.23 BILATERAL IMPACTED CERUMEN: ICD-10-CM

## 2024-06-18 DIAGNOSIS — Z28.39 UNDERIMMUNIZED: ICD-10-CM

## 2024-06-18 DIAGNOSIS — J30.89 NON-SEASONAL ALLERGIC RHINITIS, UNSPECIFIED TRIGGER: ICD-10-CM

## 2024-06-18 DIAGNOSIS — Z11.59 NEED FOR HEPATITIS C SCREENING TEST: ICD-10-CM

## 2024-06-18 DIAGNOSIS — Z11.4 ENCOUNTER FOR SCREENING FOR HIV: ICD-10-CM

## 2024-06-18 PROBLEM — Z00.00 GENERAL MEDICAL EXAM: Status: ACTIVE | Noted: 2024-06-18

## 2024-06-18 PROBLEM — R21 RASH: Status: RESOLVED | Noted: 2021-08-02 | Resolved: 2024-06-18

## 2024-06-18 PROBLEM — J01.91 ACUTE RECURRENT SINUSITIS: Status: RESOLVED | Noted: 2021-08-02 | Resolved: 2024-06-18

## 2024-06-18 PROCEDURE — 90715 TDAP VACCINE 7 YRS/> IM: CPT | Performed by: PHYSICIAN ASSISTANT

## 2024-06-18 PROCEDURE — 3008F BODY MASS INDEX DOCD: CPT | Performed by: PHYSICIAN ASSISTANT

## 2024-06-18 PROCEDURE — 90471 IMMUNIZATION ADMIN: CPT | Performed by: PHYSICIAN ASSISTANT

## 2024-06-18 PROCEDURE — 99396 PREV VISIT EST AGE 40-64: CPT | Mod: 25 | Performed by: PHYSICIAN ASSISTANT

## 2024-06-18 ASSESSMENT — ENCOUNTER SYMPTOMS
NAUSEA: 0
DIARRHEA: 0
SHORTNESS OF BREATH: 0
CHEST TIGHTNESS: 0
SINUS PRESSURE: 0
VOMITING: 0
WHEEZING: 0
DIZZINESS: 0
COUGH: 0
ABDOMINAL PAIN: 0
CONSTIPATION: 0
SORE THROAT: 0

## 2024-06-18 ASSESSMENT — PATIENT HEALTH QUESTIONNAIRE - PHQ9: SUM OF ALL RESPONSES TO PHQ9 QUESTIONS 1 & 2: 0

## 2024-06-18 NOTE — ASSESSMENT & PLAN NOTE
New onset. Recommended debrox or diluted hydrogen peroxide in the ears daily x 1 week then follow up for cerumen removal.

## 2024-06-18 NOTE — PROGRESS NOTES
"         [facility] in [place]         Holly Peterson is a 48 y.o. female who presents for physical exam.     She has regular eye exams and dental cleanings.  She has skin checks with dermatology.   She follows up with GYN.   She is due for annual mammogram.   She had a colonoscopy a few years ago for abd pain.           Past Medical History:   Diagnosis Date    Allergic rhinitis        Past Surgical History:   Procedure Laterality Date    SINUS SURGERY  2006       Social History     Tobacco Use    Smoking status: Never    Smokeless tobacco: Never   Substance Use Topics    Alcohol use: Not Currently    Drug use: Not Currently       Family History   Problem Relation Age of Onset    Brain Aneurysm Biological Father     Drug abuse Biological Father        No known allergies    Current Outpatient Medications   Medication Sig Dispense Refill    cetirizine (ZyrTEC) 10 mg tablet Take 10 mg by mouth daily.       No current facility-administered medications for this visit.       Review of Systems   HENT:  Positive for congestion. Negative for ear pain, sinus pressure and sore throat.    Respiratory:  Negative for cough, chest tightness, shortness of breath and wheezing.    Cardiovascular:  Negative for chest pain and leg swelling.   Gastrointestinal:  Negative for abdominal pain, constipation, diarrhea, nausea and vomiting.   Neurological:  Negative for dizziness.       Objective   Vitals:    06/18/24 1032   BP: 122/82   BP Location: Left upper arm   Patient Position: Sitting   Pulse: 99   Resp: 19   Temp: 37.2 °C (98.9 °F)   TempSrc: Temporal   SpO2: 99%   Weight: 61 kg (134 lb 6.4 oz)   Height: 1.753 m (5' 9\")     Body mass index is 19.85 kg/m².    Physical Exam  Constitutional:       Appearance: Normal appearance.   HENT:      Head: Normocephalic and atraumatic.      Right Ear: External ear normal. There is impacted cerumen.      Left Ear: External ear normal. There is impacted cerumen.      " Nose: Nose normal. No congestion.      Right Turbinates: Not swollen.      Left Turbinates: Swollen.      Comments: Mildly swollen left turbinates     Mouth/Throat:      Mouth: Mucous membranes are moist.      Pharynx: Oropharynx is clear. No oropharyngeal exudate.   Eyes:      Pupils: Pupils are equal, round, and reactive to light.   Cardiovascular:      Rate and Rhythm: Normal rate and regular rhythm.      Heart sounds: Normal heart sounds.   Pulmonary:      Effort: Pulmonary effort is normal.      Breath sounds: Normal breath sounds. No wheezing, rhonchi or rales.   Abdominal:      General: Bowel sounds are normal.      Palpations: Abdomen is soft.      Tenderness: There is no abdominal tenderness.   Musculoskeletal:      Right lower leg: No edema.      Left lower leg: No edema.   Lymphadenopathy:      Cervical: No cervical adenopathy.   Neurological:      General: No focal deficit present.      Mental Status: She is alert.   Psychiatric:         Mood and Affect: Mood normal.         Behavior: Behavior normal.         Lab Results   Component Value Date    WBC 3.5 04/23/2021    HGB 12.5 04/23/2021    HCT 36.8 04/23/2021     04/23/2021    ALT 11 04/23/2021    AST 13 04/23/2021     04/23/2021    K 4.5 04/23/2021     04/23/2021    CREATININE 0.80 04/23/2021    BUN 11 04/23/2021    CO2 28 04/23/2021    TSH 1.130 04/23/2021         Assessment   Problem List Items Addressed This Visit       Allergic rhinitis     Recommended flonase         Encounter for annual general medical examination with abnormal findings in adult - Primary     Ordered mammogram. Request colonoscopy report. Ordered labs. Update Tdap.          Relevant Orders    CBC and Differential    Comprehensive metabolic panel    Lipid panel    Bilateral impacted cerumen     New onset. Recommended debrox or diluted hydrogen peroxide in the ears daily x 1 week then follow up for cerumen removal.           Other Visit Diagnoses       Vitamin D  deficiency        Relevant Orders    Vitamin D 25 hydroxy    Screening mammogram for breast cancer        Relevant Orders    BI SCREENING MAMMOGRAM BILATERAL(TOMOSYNTHESIS)    Encounter for screening for HIV        Relevant Orders    HIV 1,2 AB P24 AG    Need for hepatitis C screening test        Relevant Orders    HEPATITIS C AB W/RFX TO HCV RNA,PCR    Underimmunized        Relevant Orders    Tdap vaccine greater than or equal to 8yo IM (Completed)               NABEEL Kwong  6/18/2024

## 2024-07-08 LAB
BASOPHILS # BLD AUTO: 0.1 X10E3/UL (ref 0–0.2)
BASOPHILS NFR BLD AUTO: 1 %
EOSINOPHIL # BLD AUTO: 0.4 X10E3/UL (ref 0–0.4)
EOSINOPHIL NFR BLD AUTO: 8 %
ERYTHROCYTE [DISTWIDTH] IN BLOOD BY AUTOMATED COUNT: 12.3 % (ref 11.7–15.4)
HCT VFR BLD AUTO: 37.1 % (ref 34–46.6)
HGB BLD-MCNC: 11.7 G/DL (ref 11.1–15.9)
IMM GRANULOCYTES # BLD AUTO: 0 X10E3/UL (ref 0–0.1)
IMM GRANULOCYTES NFR BLD AUTO: 0 %
LYMPHOCYTES # BLD AUTO: 1.6 X10E3/UL (ref 0.7–3.1)
LYMPHOCYTES NFR BLD AUTO: 30 %
MCH RBC QN AUTO: 28.4 PG (ref 26.6–33)
MCHC RBC AUTO-ENTMCNC: 31.5 G/DL (ref 31.5–35.7)
MCV RBC AUTO: 90 FL (ref 79–97)
MONOCYTES # BLD AUTO: 0.5 X10E3/UL (ref 0.1–0.9)
MONOCYTES NFR BLD AUTO: 9 %
NEUTROPHILS # BLD AUTO: 2.7 X10E3/UL (ref 1.4–7)
NEUTROPHILS NFR BLD AUTO: 52 %
PLATELET # BLD AUTO: 201 X10E3/UL (ref 150–450)
RBC # BLD AUTO: 4.12 X10E6/UL (ref 3.77–5.28)
WBC # BLD AUTO: 5.2 X10E3/UL (ref 3.4–10.8)

## 2024-07-09 LAB
25(OH)D3+25(OH)D2 SERPL-MCNC: 27.5 NG/ML (ref 30–100)
ALBUMIN SERPL-MCNC: 4.3 G/DL (ref 3.9–4.9)
ALP SERPL-CCNC: 69 IU/L (ref 44–121)
ALT SERPL-CCNC: 13 IU/L (ref 0–32)
AST SERPL-CCNC: 15 IU/L (ref 0–40)
BILIRUB SERPL-MCNC: 0.3 MG/DL (ref 0–1.2)
BUN SERPL-MCNC: 13 MG/DL (ref 6–24)
BUN/CREAT SERPL: 15 (ref 9–23)
CALCIUM SERPL-MCNC: 9.7 MG/DL (ref 8.7–10.2)
CHLORIDE SERPL-SCNC: 101 MMOL/L (ref 96–106)
CHOLEST SERPL-MCNC: 203 MG/DL (ref 100–199)
CO2 SERPL-SCNC: 24 MMOL/L (ref 20–29)
CREAT SERPL-MCNC: 0.85 MG/DL (ref 0.57–1)
EGFRCR SERPLBLD CKD-EPI 2021: 86 ML/MIN/1.73
GLOBULIN SER CALC-MCNC: 2.2 G/DL (ref 1.5–4.5)
GLUCOSE SERPL-MCNC: 88 MG/DL (ref 70–99)
HCV AB SERPL QL IA: NORMAL
HCV IGG SERPL QL IA: NON REACTIVE
HDLC SERPL-MCNC: 77 MG/DL
HIV 1+2 AB+HIV1 P24 AG SERPL QL IA: NON REACTIVE
LDLC SERPL CALC-MCNC: 114 MG/DL (ref 0–99)
POTASSIUM SERPL-SCNC: 4.7 MMOL/L (ref 3.5–5.2)
PROT SERPL-MCNC: 6.5 G/DL (ref 6–8.5)
SODIUM SERPL-SCNC: 138 MMOL/L (ref 134–144)
TRIGL SERPL-MCNC: 68 MG/DL (ref 0–149)
VLDLC SERPL CALC-MCNC: 12 MG/DL (ref 5–40)

## 2024-07-11 ENCOUNTER — TELEPHONE (OUTPATIENT)
Dept: FAMILY MEDICINE | Facility: CLINIC | Age: 49
End: 2024-07-11
Payer: COMMERCIAL

## 2024-07-11 NOTE — TELEPHONE ENCOUNTER
Spoke to Holly, reviewed labs and recommendations and she had no questions or concerns                 ----- Message from NABEEL Kwong sent at 7/10/2024  5:02 PM EDT -----  Please notify patient that labs show mildly low vitamin D. I recommend starting otc vitamin D3 1,000 units daily. Cholesterol is mildly elevated. I recommend limiting fats/oils/meats/animal products in your diet. The remainder of her labs are normal

## 2024-08-01 ENCOUNTER — OFFICE VISIT (OUTPATIENT)
Dept: FAMILY MEDICINE | Facility: CLINIC | Age: 49
End: 2024-08-01
Payer: COMMERCIAL

## 2024-08-01 VITALS
BODY MASS INDEX: 19.96 KG/M2 | WEIGHT: 134.8 LBS | OXYGEN SATURATION: 97 % | TEMPERATURE: 98.1 F | HEART RATE: 78 BPM | DIASTOLIC BLOOD PRESSURE: 78 MMHG | SYSTOLIC BLOOD PRESSURE: 132 MMHG | RESPIRATION RATE: 16 BRPM | HEIGHT: 69 IN

## 2024-08-01 DIAGNOSIS — J32.0 CHRONIC MAXILLARY SINUSITIS: Primary | ICD-10-CM

## 2024-08-01 DIAGNOSIS — H61.23 BILATERAL IMPACTED CERUMEN: ICD-10-CM

## 2024-08-01 PROCEDURE — 99213 OFFICE O/P EST LOW 20 MIN: CPT | Mod: 25 | Performed by: PHYSICIAN ASSISTANT

## 2024-08-01 PROCEDURE — 69210 REMOVE IMPACTED EAR WAX UNI: CPT | Performed by: PHYSICIAN ASSISTANT

## 2024-08-01 PROCEDURE — 3008F BODY MASS INDEX DOCD: CPT | Performed by: PHYSICIAN ASSISTANT

## 2024-08-01 RX ORDER — AZITHROMYCIN 250 MG/1
TABLET, FILM COATED ORAL
Start: 2024-08-01 | End: 2024-08-01 | Stop reason: SDUPTHER

## 2024-08-01 RX ORDER — OFLOXACIN 3 MG/ML
10 SOLUTION AURICULAR (OTIC) DAILY
Qty: 5 ML | Refills: 0 | Status: SHIPPED | OUTPATIENT
Start: 2024-08-01 | End: 2024-08-08

## 2024-08-01 RX ORDER — METHYLPREDNISOLONE 4 MG/1
TABLET ORAL
Qty: 21 TABLET | Refills: 0 | Status: SHIPPED | OUTPATIENT
Start: 2024-08-01 | End: 2024-08-08

## 2024-08-01 RX ORDER — AZITHROMYCIN 250 MG/1
TABLET, FILM COATED ORAL
Qty: 6 TABLET | Refills: 0 | Status: SHIPPED | OUTPATIENT
Start: 2024-08-01 | End: 2024-08-06

## 2024-08-01 RX ORDER — AZITHROMYCIN 250 MG/1
TABLET, FILM COATED ORAL
Qty: 6 TABLET | Refills: 0 | Status: SHIPPED
Start: 2024-08-01 | End: 2024-08-01 | Stop reason: ENTERED-IN-ERROR

## 2024-08-01 ASSESSMENT — ENCOUNTER SYMPTOMS
SORE THROAT: 0
WHEEZING: 0
PALPITATIONS: 0
DIARRHEA: 0
ABDOMINAL PAIN: 0
FEVER: 0
NAUSEA: 0
VOMITING: 0
CHEST TIGHTNESS: 0
SINUS PRESSURE: 1
SHORTNESS OF BREATH: 0
COUGH: 0
CONSTIPATION: 0

## 2024-08-01 NOTE — ASSESSMENT & PLAN NOTE
Cerumen removed via water flush. Small area of pink in EAC, prescribed ofloxacin drops in case of any open areas to prevent infection.    36.4

## 2024-08-01 NOTE — PROCEDURES
Ear Cerumen Removal    Date/Time: 8/1/2024 3:13 PM    Performed by: Jose Luis Hilliard PA C  Authorized by: Jose Luis Hilliard PA C    Consent:     Consent obtained:  Verbal    Consent given by:  Patient    Risks, benefits, and alternatives were discussed: yes      Risks discussed:  Bleeding, dizziness, incomplete removal and infection  Universal protocol:     Patient identity confirmed:  Verbally with patient  Procedure details:     Location:  L ear and R ear    Procedure type: curette      Procedure type comment:  Curette and irrigation    Procedure outcomes: cerumen removed    Post-procedure details:     Inspection:  Ear canal clear, no bleeding and macerated skin    Hearing quality:  Improved    Procedure completion:  Tolerated well, no immediate complications

## 2024-08-01 NOTE — PROGRESS NOTES
[facility] in [place]         Holly Peterson is a 48 y.o. female who presents for sick visit.     She states that she has chronic sinusitis. She is following up with ENT and has had sinus surgery. She c/o loss of taste and smell x 2 weeks. She c/o sneezing and congestion. She tried nasal rinse with worsening symptoms. She is going to be going out of town next week for vacation. She c/o upper teeth hurting. She denies fever, sore throat. She had negative covid test. She c/o ears feeling blocked.           Past Medical History:   Diagnosis Date    Allergic rhinitis        Past Surgical History:   Procedure Laterality Date    SINUS SURGERY  2006       Social History     Tobacco Use    Smoking status: Never    Smokeless tobacco: Never   Substance Use Topics    Alcohol use: Not Currently    Drug use: Not Currently       Family History   Problem Relation Age of Onset    Brain Aneurysm Biological Father     Drug abuse Biological Father        No known allergies    Current Outpatient Medications   Medication Sig Dispense Refill    azithromycin (ZITHROMAX) 250 mg tablet Take 2 tablets the first day, then 1 tablet daily for 4 days.      cetirizine (ZyrTEC) 10 mg tablet Take 10 mg by mouth daily.      methylPREDNISolone (MEDROL DOSEPACK) 4 mg tablet Follow package directions. 21 tablet 0    ofloxacin (FLOXIN) 0.3 % otic solution Administer 10 drops into the right ear daily for 7 days. 5 mL 0     No current facility-administered medications for this visit.       Review of Systems   Constitutional:  Negative for fever.   HENT:  Positive for congestion, hearing loss and sinus pressure. Negative for ear pain and sore throat.    Respiratory:  Negative for cough, chest tightness, shortness of breath and wheezing.    Cardiovascular:  Negative for chest pain, palpitations and leg swelling.   Gastrointestinal:  Negative for abdominal pain, constipation, diarrhea, nausea and vomiting.       Objective  "  Vitals:    08/01/24 1408   BP: 132/78   BP Location: Right upper arm   Patient Position: Sitting   Pulse: 78   Resp: 16   Temp: 36.7 °C (98.1 °F)   TempSrc: Temporal   SpO2: 97%   Weight: 61.1 kg (134 lb 12.8 oz)   Height: 1.753 m (5' 9\")     Body mass index is 19.91 kg/m².    Physical Exam  Constitutional:       Appearance: Normal appearance.   HENT:      Head: Normocephalic and atraumatic.      Right Ear: External ear normal. There is impacted cerumen.      Left Ear: External ear normal. There is impacted cerumen.      Nose: Nose normal.      Mouth/Throat:      Mouth: Mucous membranes are moist.      Pharynx: Oropharynx is clear. No oropharyngeal exudate.   Cardiovascular:      Rate and Rhythm: Normal rate and regular rhythm.      Heart sounds: Normal heart sounds.   Pulmonary:      Effort: Pulmonary effort is normal.      Breath sounds: Normal breath sounds. No wheezing, rhonchi or rales.   Lymphadenopathy:      Cervical: No cervical adenopathy.   Neurological:      General: No focal deficit present.      Mental Status: She is alert.   Psychiatric:         Mood and Affect: Mood normal.         Behavior: Behavior normal.         Lab Results   Component Value Date    WBC 5.2 07/08/2024    HGB 11.7 07/08/2024    HCT 37.1 07/08/2024     07/08/2024    CHOL 203 (H) 07/08/2024    TRIG 68 07/08/2024    HDL 77 07/08/2024    ALT 13 07/08/2024    AST 15 07/08/2024     07/08/2024    K 4.7 07/08/2024     07/08/2024    CREATININE 0.85 07/08/2024    BUN 13 07/08/2024    CO2 24 07/08/2024    TSH 1.130 04/23/2021         Assessment   Problem List Items Addressed This Visit       Bilateral impacted cerumen     Cerumen removed via water flush. Small area of pink in EAC, prescribed ofloxacin drops in case of any open areas to prevent infection.          Relevant Orders    Ear Cerumen Removal    Chronic maxillary sinusitis - Primary     Worsened. Prescribed medrol dose mariah and z-mariah. Continue flonase. Recommended " sudafed about 1-2 hours before flying. Follow up with ENT. Follow up if symptoms worsen or do not improve.                   NABEEL Kwong  8/1/2024

## 2024-08-01 NOTE — ASSESSMENT & PLAN NOTE
Worsened. Prescribed medrol dose mariah and z-mariah. Continue flonase. Recommended sudafed about 1-2 hours before flying. Follow up with ENT. Follow up if symptoms worsen or do not improve.

## 2024-08-30 ENCOUNTER — TELEPHONE (OUTPATIENT)
Dept: FAMILY MEDICINE | Facility: CLINIC | Age: 49
End: 2024-08-30
Payer: COMMERCIAL

## 2024-08-30 NOTE — TELEPHONE ENCOUNTER
I gave this to her for a sinus infection, so I do not typically refill them . If she is having a problem she should make an appointment. We did also discuss seeing ENT at her last appointment due to chronic sinus infections, so I am not sure if she has scheduled with them at all.

## 2024-08-30 NOTE — TELEPHONE ENCOUNTER
Holly was in 8/1 and  you gave her a medrol dosepack, she called and is looking to get that refilled. Does she need another appt?

## 2024-08-30 NOTE — TELEPHONE ENCOUNTER
Left message for Holly, letting her know if she is having sym of sinus infection that she would need to be seen again, Mariangelemily doesn't typically refill Medrol dosepack. If she needs appt to call back the office

## 2024-10-01 ENCOUNTER — TRANSCRIBE ORDERS (OUTPATIENT)
Dept: SCHEDULING | Age: 49
End: 2024-10-01

## 2024-10-01 DIAGNOSIS — J32.4 CHRONIC PANSINUSITIS: Primary | ICD-10-CM

## 2024-10-07 ENCOUNTER — HOSPITAL ENCOUNTER (OUTPATIENT)
Dept: RADIOLOGY | Age: 49
Discharge: HOME | End: 2024-10-07
Attending: OTOLARYNGOLOGY
Payer: COMMERCIAL

## 2024-10-07 DIAGNOSIS — J32.4 CHRONIC PANSINUSITIS: ICD-10-CM

## 2024-10-07 PROCEDURE — 70486 CT MAXILLOFACIAL W/O DYE: CPT

## 2024-12-26 ENCOUNTER — NURSE TRIAGE (OUTPATIENT)
Dept: FAMILY MEDICINE | Facility: CLINIC | Age: 49
End: 2024-12-26
Payer: COMMERCIAL

## 2024-12-26 NOTE — TELEPHONE ENCOUNTER
Regarding: red swollen painful lump on rt side of groin .  Call transferred to Primary Care Nurse Triage Line  ----- Message from Linh ARIAS sent at 12/26/2024  1:24 PM EST -----  Patient called today with red flag complaint of red swollen painful lump on rt side of groin .  Call transferred to Primary Care Nurse Triage Line

## 2024-12-26 NOTE — TELEPHONE ENCOUNTER
"Patient transferred to the Primary Care Telephonic Nurse Triage Line with complaints of wound in groin     HPI:  Pt reports infected looking lump in groin. Pt self treating with mupirocin without improvement. Pt Says it is about the size of 2 inc x 2 inches. +redness and warmth. No discharge. Pt says had similar in armpit last month that went away with mupiricin.Pt given sds tomorrow at 130. Call back or UC if worse. Pt verbalized understanding.    Disposition:  See Today in Office    Care Advice:  Care Advice Given    Patient/Caregiver understands and will follow care advice?: Yes, plans to follow advice      Given By Given At Modified    Mari KIwona Ernandez 12/26/2024  1:33 PM Yes           Disposition and First Aid    SEE IN OFFICE OR VIDEO VISIT TODAY:  * You need to be examined. First available is tomorrow at 130pm with Jose Luis.     Mari NICOLETTEIwona Ernandez 12/26/2024  1:33 PM No           Wound Infection Suspected (Pending Office Visit)    WOUND INFECTION - TREATMENT WITH LOCAL HEAT:  * Use a heat pack, heating pad, or warm wet washcloth (put inside a plastic bag).   * Place it on top of the dressing.   * Do this for 10 minutes three times a day.  * This will help increase blood flow and improve healing.  * Caution: Avoid burn. Do not sleep on a heating pad.    Mari Ernandez 12/26/2024  1:33 PM No           Wound Infection Suspected (Pending Office Visit)    CALL BACK IF:  * Fever occurs  * You become worse    Mari Ernandez 12/26/2024  1:33 PM No           Care of Sutured or Stapled Wound    CALL BACK IF:  * Looks infected (increasing pain or tenderness, pus, spreading redness)  * You become worse           Reason for Disposition   Looks infected (spreading redness, red streak) and no fever    Answer Assessment - Initial Assessment Questions  1. LOCATION: \"Where is the wound located?\"       R inside groin   2. WOUND APPEARANCE: \"What does the wound look like?\"       red warm   3. SIZE: If redness is present, " "ask: \"What is the size of the red area?\" (Inches, centimeters, or compare to size of a coin)       size of 2 inch x 2 inch  4. SPREAD: \"What's changed in the last day?\"  \"Do you see any red streaks coming from the wound?\"        5. ONSET: \"When did it start to look infected?\"       4 d  6. MECHANISM: \"How did the wound start, what was the cause?\"      pt asking mrsa   7. PAIN: Do you have any pain?\"  If Yes, ask: \"How bad is the pain?\"  (e.g., Scale 1-10; mild, moderate, or severe)   [place]   8. FEVER: \"Do you have a fever?\" If Yes, ask: \"What is your temperature, how was it measured, and when did it start?\"      no  9. OTHER SYMPTOMS: \"Do you have any other symptoms?\" (e.g., shaking chills, weakness, rash elsewhere on body)      no  10. PREGNANCY: \"Is there any chance you are pregnant?\" \"When was your last menstrual period?\"    Protocols used: Wound Infection Suspected-ADULT-[place]    "

## 2024-12-27 ENCOUNTER — OFFICE VISIT (OUTPATIENT)
Dept: FAMILY MEDICINE | Facility: CLINIC | Age: 49
End: 2024-12-27
Payer: COMMERCIAL

## 2024-12-27 VITALS
WEIGHT: 139 LBS | SYSTOLIC BLOOD PRESSURE: 126 MMHG | OXYGEN SATURATION: 98 % | DIASTOLIC BLOOD PRESSURE: 80 MMHG | TEMPERATURE: 98.9 F | HEART RATE: 97 BPM | HEIGHT: 69 IN | RESPIRATION RATE: 17 BRPM | BODY MASS INDEX: 20.59 KG/M2

## 2024-12-27 DIAGNOSIS — L02.214 ABSCESS OF RIGHT GROIN: Primary | ICD-10-CM

## 2024-12-27 PROCEDURE — 99213 OFFICE O/P EST LOW 20 MIN: CPT | Performed by: PHYSICIAN ASSISTANT

## 2024-12-27 PROCEDURE — 3008F BODY MASS INDEX DOCD: CPT | Performed by: PHYSICIAN ASSISTANT

## 2024-12-27 RX ORDER — BUDESONIDE 0.5 MG/2ML
0.5 INHALANT ORAL
COMMUNITY
Start: 2024-09-23

## 2024-12-27 RX ORDER — SULFAMETHOXAZOLE AND TRIMETHOPRIM 800; 160 MG/1; MG/1
1 TABLET ORAL 2 TIMES DAILY
Qty: 20 TABLET | Refills: 0 | Status: SHIPPED | OUTPATIENT
Start: 2024-12-27 | End: 2025-01-06

## 2024-12-27 ASSESSMENT — ENCOUNTER SYMPTOMS
FEVER: 0
ROS SKIN COMMENTS: LUMP IN GROIN

## 2024-12-27 NOTE — PROGRESS NOTES
"         [facility] in [place]         Holly Peterson is a 49 y.o. female who presents for     She c/o lump in the right groin that has become more red and swollen over the past 5 days. She has tried topical mupirocin without improvement. She denies fever. She is concerned about MRSA and has history of abscess on right leg.           Past Medical History:   Diagnosis Date    Allergic rhinitis        Past Surgical History   Procedure Laterality Date    Sinus surgery  2006       Social History     Tobacco Use    Smoking status: Never    Smokeless tobacco: Never   Substance Use Topics    Alcohol use: Not Currently    Drug use: Not Currently       Family History   Problem Relation Name Age of Onset    Brain Aneurysm Biological Father      Drug abuse Biological Father         No known allergies    Current Outpatient Medications   Medication Sig Dispense Refill    budesonide (PULMICORT) 0.5 mg/2 mL nebulizer solution 0.5 mg.      cetirizine (ZyrTEC) 10 mg tablet Take 10 mg by mouth daily.       No current facility-administered medications for this visit.       Review of Systems   Constitutional:  Negative for fever.   Skin:         lump in groin       Objective   Vitals:    12/27/24 1335   BP: 126/80   BP Location: Left upper arm   Patient Position: Sitting   Pulse: 97   Resp: 17   Temp: 37.2 °C (98.9 °F)   TempSrc: Temporal   SpO2: 98%   Weight: 63 kg (139 lb)   Height: 1.753 m (5' 9\")     Body mass index is 20.53 kg/m².    Physical Exam  Constitutional:       Appearance: Normal appearance.   HENT:      Head: Normocephalic and atraumatic.   Pulmonary:      Effort: Pulmonary effort is normal.   Skin:     Findings: Abscess present.      Comments: Right groin abscess ~1.5cm with erythema and tenderness, expressed purulent drainage and scant bleeding   Neurological:      General: No focal deficit present.      Mental Status: She is alert.   Psychiatric:         Mood and Affect: Mood normal.         " Behavior: Behavior normal.         Lab Results   Component Value Date    WBC 5.2 07/08/2024    HGB 11.7 07/08/2024    HCT 37.1 07/08/2024     07/08/2024    CHOL 203 (H) 07/08/2024    TRIG 68 07/08/2024    HDL 77 07/08/2024    ALT 13 07/08/2024    AST 15 07/08/2024     07/08/2024    K 4.7 07/08/2024     07/08/2024    CREATININE 0.85 07/08/2024    BUN 13 07/08/2024    CO2 24 07/08/2024    TSH 1.130 04/23/2021         Assessment   Assessment & Plan  Abscess of right groin  New onset. Expressed purulent drainage from opening during exam today. Prescribed Bactrim. Continue to keep the area clean and covered. Follow up if symptoms worsen or do not improve.                   NABEEL Kwong  12/27/2024

## 2024-12-27 NOTE — ASSESSMENT & PLAN NOTE
New onset. Expressed purulent drainage from opening during exam today. Prescribed Bactrim. Continue to keep the area clean and covered. Follow up if symptoms worsen or do not improve.